# Patient Record
(demographics unavailable — no encounter records)

---

## 2025-07-21 NOTE — CONSULT LETTER
[Dear  ___] : Dear  [unfilled], [Consult Letter:] : I had the pleasure of evaluating your patient, [unfilled]. [Please see my note below.] : Please see my note below. [Consult Closing:] : Thank you very much for allowing me to participate in the care of this patient.  If you have any questions, please do not hesitate to contact me. [Sincerely,] : Sincerely, [FreeTextEntry3] : Samson Coley MD, FACP, AGAF, FAASLD\par   of Medicine\par  [facility]\par

## 2025-07-21 NOTE — PHYSICAL EXAM
[General Appearance - Alert] : alert [General Appearance - In No Acute Distress] : in no acute distress [PERRL With Normal Accommodation] : pupils were equal in size, round, and reactive to light [Extraocular Movements] : extraocular movements were intact [Outer Ear] : the ears and nose were normal in appearance [Neck Appearance] : the appearance of the neck was normal [Neck Cervical Mass (___cm)] : no neck mass was observed [Jugular Venous Distention Increased] : there was no jugular-venous distention [Thyroid Diffuse Enlargement] : the thyroid was not enlarged [Thyroid Nodule] : there were no palpable thyroid nodules [Heart Sounds] : normal S1 and S2 [Heart Sounds Gallop] : no gallops [Heart Sounds Pericardial Friction Rub] : no pericardial rub [Full Pulse] : the pedal pulses are present [Edema] : there was no peripheral edema [Abdomen Mass (___ Cm)] : no abdominal mass palpated [Normal Sphincter Tone] : normal sphincter tone [External Hemorrhoid] : external hemorrhoids [Cervical Lymph Nodes Enlarged Posterior Bilaterally] : posterior cervical [Cervical Lymph Nodes Enlarged Anterior Bilaterally] : anterior cervical [Supraclavicular Lymph Nodes Enlarged Bilaterally] : supraclavicular [Axillary Lymph Nodes Enlarged Bilaterally] : axillary [Femoral Lymph Nodes Enlarged Bilaterally] : femoral [Inguinal Lymph Nodes Enlarged Bilaterally] : inguinal [No CVA Tenderness] : no ~M costovertebral angle tenderness [No Spinal Tenderness] : no spinal tenderness [Abnormal Walk] : normal gait [Nail Clubbing] : no clubbing  or cyanosis of the fingernails [Musculoskeletal - Swelling] : no joint swelling seen [Motor Tone] : muscle strength and tone were normal [Skin Color & Pigmentation] : normal skin color and pigmentation [Skin Turgor] : normal skin turgor [Deep Tendon Reflexes (DTR)] : deep tendon reflexes were 2+ and symmetric [Sensation] : the sensory exam was normal to light touch and pinprick [No Focal Deficits] : no focal deficits [Impaired Insight] : insight and judgment were intact [Affect] : the affect was normal [Alert] : alert [Normal Voice/Communication] : normal voice/communication [Healthy Appearing] : healthy appearing [No Acute Distress] : no acute distress [Sclera] : the sclera and conjunctiva were normal [Hearing Threshold Finger Rub Not [place]] : hearing was normal [Normal Lips/Gums] : the lips and gums were normal [Oropharynx] : the oropharynx was normal [Normal Appearance] : the appearance of the neck was normal [No Neck Mass] : no neck mass was observed [No Respiratory Distress] : no respiratory distress [No Acc Muscle Use] : no accessory muscle use [Respiration, Rhythm And Depth] : normal respiratory rhythm and effort [Auscultation Breath Sounds / Voice Sounds] : lungs were clear to auscultation bilaterally [Heart Rate And Rhythm] : heart rate was normal and rhythm regular [Normal S1, S2] : normal S1 and S2 [Murmurs] : no murmurs [Bowel Sounds] : normal bowel sounds [Abdomen Tenderness] : non-tender [No Masses] : no abdominal mass palpated [Abdomen Soft] : soft [] : no hepatosplenomegaly [Oriented To Time, Place, And Person] : oriented to person, place, and time [de-identified] : anoscopy performed RAL

## 2025-07-21 NOTE — ASSESSMENT
Called patient to schedule appointment, appointment scheduled but it would not let me assign referral.   [FreeTextEntry1] : Patient is a 63year-old female with history of hyperlipidemia and a negative colonoscopy in 2015 who returns in follow-up with several months of rectal discomfort without bleeding.  There is no weight loss anorexia or change in bowel habits.  She is up-to-date with mammogram  RTO 7/21/25- complains of prolapsing hemorrhoids. Had colonscopy 2021 with 5mm Ta. otherwise, normal. Denies rectal bleeding or pain.   PROCEDURE: Anoscopy  Pre procedure:inthem  Post procedure:grade 1-2 RA hem  Anesthesia: none  Estimated blood loss; None  Specimen: none  Complications: none  Consent obtained. Anoscopy was performed by passing a lighted anoscope into the anus. The mucosal surface was inspected. Findings included :banding RA hem, tolerated well    Patient tolerated examination and procedure well without immediate complications.  IMP: 1. s/p hemorrhoidal banding 2. Hx of TA  PLAN; 1. rto 2 weeks 2. next colonoscopy 2028